# Patient Record
Sex: MALE | Race: WHITE | URBAN - METROPOLITAN AREA
[De-identification: names, ages, dates, MRNs, and addresses within clinical notes are randomized per-mention and may not be internally consistent; named-entity substitution may affect disease eponyms.]

---

## 2021-12-27 ENCOUNTER — OUTPATIENT (OUTPATIENT)
Dept: OUTPATIENT SERVICES | Facility: HOSPITAL | Age: 31
LOS: 1 days | End: 2021-12-27
Payer: COMMERCIAL

## 2021-12-27 DIAGNOSIS — Z20.828 CONTACT WITH AND (SUSPECTED) EXPOSURE TO OTHER VIRAL COMMUNICABLE DISEASES: ICD-10-CM

## 2021-12-27 LAB — SARS-COV-2 RNA SPEC QL NAA+PROBE: SIGNIFICANT CHANGE UP

## 2021-12-27 PROCEDURE — U0005: CPT

## 2021-12-27 PROCEDURE — C9803: CPT

## 2021-12-27 PROCEDURE — U0003: CPT

## 2021-12-28 DIAGNOSIS — Z20.828 CONTACT WITH AND (SUSPECTED) EXPOSURE TO OTHER VIRAL COMMUNICABLE DISEASES: ICD-10-CM

## 2024-01-29 ENCOUNTER — APPOINTMENT (OUTPATIENT)
Dept: ORTHOPEDIC SURGERY | Facility: CLINIC | Age: 34
End: 2024-01-29
Payer: COMMERCIAL

## 2024-01-29 ENCOUNTER — TRANSCRIPTION ENCOUNTER (OUTPATIENT)
Age: 34
End: 2024-01-29

## 2024-01-29 DIAGNOSIS — Z78.9 OTHER SPECIFIED HEALTH STATUS: ICD-10-CM

## 2024-01-29 DIAGNOSIS — M77.12 LATERAL EPICONDYLITIS, LEFT ELBOW: ICD-10-CM

## 2024-01-29 PROBLEM — Z00.00 ENCOUNTER FOR PREVENTIVE HEALTH EXAMINATION: Status: ACTIVE | Noted: 2024-01-29

## 2024-01-29 PROCEDURE — 99203 OFFICE O/P NEW LOW 30 MIN: CPT | Mod: 25

## 2024-01-29 PROCEDURE — J3490M: CUSTOM

## 2024-01-29 PROCEDURE — 73080 X-RAY EXAM OF ELBOW: CPT | Mod: LT

## 2024-01-29 PROCEDURE — 20551 NJX 1 TENDON ORIGIN/INSJ: CPT

## 2024-01-29 RX ORDER — OMEPRAZOLE 40 MG/1
40 CAPSULE, DELAYED RELEASE ORAL
Refills: 0 | Status: ACTIVE | COMMUNITY

## 2024-01-29 RX ORDER — BUPRENORPHINE HYDROCHLORIDE, NALOXONE HYDROCHLORIDE 4; 1 MG/1; MG/1
4-1 FILM, SOLUBLE BUCCAL; SUBLINGUAL
Refills: 0 | Status: ACTIVE | COMMUNITY

## 2024-01-29 NOTE — ASSESSMENT
[FreeTextEntry1] : -The patient's diagnosis and treatments options were reviewed in detail. -Expect this to be a chronic issue sometimes lasting over a year and necessitating treatment during that time. -Trial of home exercises is advised, a pamphlet was handed to the patient with further instructions. -Prescription NSAIDs including Voltaren gel also discussed.  -Proper gripping and lifting reviewed in detail; avoid strenuous repetitive lifting. -CSI Left Elbow today - tolerated well  -Discussed surgical repair for refractory cases. -Will monitor progression closely. -Discussed timing of repeat injections  -RTC 4 weeks, consider another injection

## 2024-01-29 NOTE — PROCEDURE
[FreeTextEntry3] : Tendon origin injection was performed of the left lateral epicondyle. The indication for this procedure was pain and inflammation. The site was prepped with alcohol, betadine, ethyl chloride sprayed topically and sterile technique used. An injection of Betamethasone (Celestone) 1cc of 3mg, Bupivacaine (Marcaine) 2cc of 0.25%  was used.  Patient has tried OTC's including aspirin, Ibuprofen, Aleve, etc or prescription NSAIDS, and/or exercises at home and/or physical therapy without satisfactory response, patient had decreased mobility in the joint and the risks benefits, and alternatives have been discussed, and verbal consent was obtained.

## 2024-01-29 NOTE — HISTORY OF PRESENT ILLNESS
[7] : 7 [6] : 6 [Sharp] : sharp [Frequent] : frequent [Household chores] : household chores [Work] : work [Rest] : rest [Exercising] : exercising [de-identified] : 1/29/24: Here for left elbow. Denies no specific injury, just pain with lifting over last several months.  [] : no [FreeTextEntry1] : left elbow [FreeTextEntry3] : n/a [FreeTextEntry6] : cramping [FreeTextEntry7] : radiates down to the wrist

## 2024-01-29 NOTE — IMAGING
[de-identified] : Left Elbow No swelling, no ecchymosis Tenderness to palpation over lateral epicondyle Full elbow flexion, extension, supination, pronation 5/5 strength in flexion, extension, supination, pronation Lateral Elbow pain with resisted wrist extension No Varus or Valgus instability Motor and sensory intact distally [Left] : left elbow [There are no fractures, subluxations or dislocations. No significant abnormalities are seen] : There are no fractures, subluxations or dislocations. No significant abnormalities are seen

## 2024-02-26 ENCOUNTER — APPOINTMENT (OUTPATIENT)
Dept: ORTHOPEDIC SURGERY | Facility: CLINIC | Age: 34
End: 2024-02-26